# Patient Record
(demographics unavailable — no encounter records)

---

## 2024-12-13 NOTE — ASSESSMENT
[FreeTextEntry1] : Impression: Acute cervical and lumbar sprain, strain left shoulder  This patient will be treated with formal physical therapy along with diclofenac and tizanidine.  She will return in approximately 3-4 weeks for follow-up.

## 2024-12-13 NOTE — PHYSICAL EXAM
[de-identified] : Exam today reveals she has restricted motion in all planes to the cervical spine especially flexion extension spasm and tenderness is noted more so on the left side extending into the left trapezius no obvious trigger points present.  The left shoulder has some very mild swelling and she does have significant restriction of motion in all planes.  She is tender about the proximal humeral segment and subacromial space.  The elbow wrist and hand move well.  With regards to the low back region she has reasonable motion though it is with discomfort at the limits of motion spasm and tenderness is noted on both sides and midline no obvious trigger points present.  She is neurologically stable and intact.  X-rays ordered and taken today of the cervical and lumbar spine and the left shoulder reveal minimal degenerative changes no fractures or malalignment.  The cervical spine reveals mild cervical spondylosis no significant displaced narrowing or facet arthritis.  The lumbar series again mild spondylosis mild disc space narrowing multilevel no lesion

## 2025-01-06 NOTE — HISTORY OF PRESENT ILLNESS
[de-identified] : This 49-year-old returns she is continued to have pain in the neck low back region as well as the left shoulder the low back and left shoulder girdle are more symptomatic.  She has been in physical therapy with only minor improvement noted no motor weakness bladder bowel dysfunction

## 2025-01-06 NOTE — ASSESSMENT
[FreeTextEntry1] : Impression: Cervical and lumbar radiculitis strain left rotator cuff.  She will continue with diclofenac which I have reordered and tizanidine as well as physical therapy for the low back and ongoing to the left shoulder.  If she is not improving MRIs may become necessary

## 2025-01-06 NOTE — PHYSICAL EXAM
[de-identified] : Exam reveals normal stance and gait she has reasonable motion to the cervical spine mild spasm is noted on both sides more so on the left no trigger points left shoulder has slight restriction of full forward flexion and abduction in the scapular plane no crepitus on motion strength is good positive Neer and Zaragoza.  The lumbar segment is noted to be painful on flexion extension with spasm and tenderness on both sides of the midline no trigger points present there are no neurologic tension signs present she is intact neurologically

## 2025-03-31 NOTE — ASSESSMENT
[FreeTextEntry1] : Impression: Continued neck and low back pain.  She will continue with therapy medications have also placed her on gabapentin.  She will return in 1 month possibility of MRI has been discussed

## 2025-03-31 NOTE — HISTORY OF PRESENT ILLNESS
[de-identified] : This 49-year-old returns with continued complaints of episodic pain in the neck and low back region.  She does benefit from physical therapy which has helped.  She does note improvement with the medications as well.  She has no significant radicular symptoms to either upper or lower extremities

## 2025-03-31 NOTE — PHYSICAL EXAM
[de-identified] : Exam is notable for a normal gait.  She has reasonable motion to the cervical lumbar spine though it is with pain at the limits spasm and tenderness nonspecific is noted on both sides and midline at both sites no trigger points present.  She is without tension signs and neurologically intact

## 2025-04-28 NOTE — HISTORY OF PRESENT ILLNESS
[de-identified] : This 49-year-old returns for follow-up of complaints of pain in the neck and low back region.  She has continued to have complaints of ongoing pain and spasm in the neck and low back region.  She has had conservative management to include medications physical therapy with minimal improvement.  She did have MRIs which we have discussed.  Written results of MRIs of the lumbar spine as well as the cervical spine performed at stand-up MRI of [facility] April 8, 2025 are on the chart and have been reviewed.  With regards to the cyst in the kidneys and liver this is being addressed at [facility].

## 2025-04-28 NOTE — ASSESSMENT
[FreeTextEntry1] : Impression: Cervical and lumbar radiculitis with both the level degenerative disc spaces at both sites.  She will continue with medications as prescribed.  She has been referred for pain management

## 2025-06-09 NOTE — PHYSICAL EXAM
[de-identified] : General Appearance: Level of consciousness: Alert Body habitus: Well-nourished Signs of distress: Some noticeable discomfort. Hygiene: Clean   Psychiatric: Appropriate mood and affect. Cooperative.   Skin: Nailbeds pink with no cyanosis or clubbing. Lesions or rashes: None observed   Head and Neck: The head is normocephalic and atraumatic Eyes: Conjunctivae  clear without exudates. Sclera is non-icteric Ears: No discharge. Hearing is intact with good acuity Nose: No discharge. No nasal flaring Mouth and throat: Trachea Midline, teeth and gums are without obvious lesion   Respiratory: Breathing pattern: Normal Chest movement: Symmetrical Use of accessory muscles: Absent Cough: Absent Wheezing: Absent   Cardiovascular System: Pulse: Regular Cyanosis: Absent   Abdomen: Inspection: No distention Visible pulsations: Absent   Musculoskeletal System: Muscle strength:   strength is normal bilaterally. Gait: Steady, NO assistive device Posture: Upright Rises from a seated position with SOME difficulty due to pain     Spine: No gross deformity or signs of trauma. Curvature of the cervical, thoracic, and lumbar spine are within normal limits. Spinous processes are midline. NO tenderness of spinous processes. Paraspinal musculature is NOT hypertonic NO discomfort is noted with flexion MILD discomfort is noted with extension No discomfort is noted with side-to-side rotation   Straight leg raise test is negative bilaterally.   Joint examination: No Swelling, No gross deformities.     Neurological System: Cranial nerves 2-12: Grossly intact Motor function: Moving all extremities against gravity Dorsi/plantar flexion is normal bilaterally. Sensation: No gross deficit to light touch

## 2025-06-09 NOTE — ASSESSMENT
[FreeTextEntry1] : Ms. CHRISTINA ALONZO is a 49 year F suffering from neck back pain, that based upon today's subjective complaints, physical examination, and MRI review, is likely multifactorial  >> Medications  Chronic opioid use for non-malignant pain, in particular at high doses would not be recommended since it can potentially lead to hyperalgesia (hypersensitivity), tolerance and addiction.    Cw Tizanidine, Diclofenac PRN   >> Interventions   Not amenable to RUTHIE or in office TPI - will research more  4.4 cm c7/t1   >> Therapy and Other Modalities   diagnosis: cervical spondylosis  periscapular and scapulothoracic stretching and strengthening teach home exercise regimen massage and myofascial release increase ROM, strengthening, postural training, other modalities [facility] - 2x weekly / 6 weeks Precautions - universal safety, falls  >> Imaging and Other Studies  See Media   >> Consults  None ordered

## 2025-06-09 NOTE — REASON FOR VISIT
[Initial Consultation] : an initial pain management consultation [Spouse] : spouse [FreeTextEntry2] : Referred by Dr. Price - Orthopedic and PCP - Dr. Johnson

## 2025-06-09 NOTE — DATA REVIEWED
[FreeTextEntry1] :  First Name:	CHRISTINA Last Name:	CHERI Date of Birth:	06-Sep-1975 Gender:	F MRN:	[id number] Exam Date:	08-Apr-2025 3:15:00 PM Exam Description:	CERVICAL SPINE MRI [id number] Referring Physician:	DEAN VERAS Physician(s):	 Report Date:	08-Apr-2025 3:15:00 PM Completion Status:	COMPLETE Verification Status:	VERIFIED Diagnostic Imaging Report Language of Content Item and Descendants: English  Procedure Study Instance UID: [id number]  Subject Name: CHRISTINA ALONZO  Findings Finding: PATIENT NAME: CHRISTINA ALONZO  ID NUMBER: [id number]  YOB: 1975  REFERRING PHYSICIAN: DEAN VERAS MD [address] [place] [id number]  DATE OF SERVICE: 04/08/2025  MAGNETIC RESONANCE IMAGING OF THE CERVICAL SPINE  TECHNIQUE: Multiplanar, multisequential MRI was performed in the neutral/sitting position.  HISTORY: Cervical pain and radiculopathy.  INTERPRETATION: Straightening of the cervical lordosis noted. Craniocervical junction is normal.  C2-C3, C3-C4: No disc herniation, central canal or foraminal narrowing.  C4-C5: Central disc herniation abuts the cord. Neural foramina are patent.  C5-C6: Posterior disc herniation abuts the cord. Neural foramina remain patent.  C6-C7: Posterior central disc herniation indents the ventral CSF space without central canal or foraminal narrowing.  C7-T1: No disc herniation, central canal or foraminal narrowing.  No evidence for fracture, disc space narrowing, anterolisthesis, marrow edema, infiltrative marrow process, or focal intraosseous lesion. No levels with central stenosis. Craniocervical junction is normal.  Examination, otherwise, demonstrates no significant protrusions into the neural canal, recesses or foramina. The cervical cord is, otherwise, unremarkable in signal and morphology. There is, otherwise, no evidence of syrinx or Chiari malformation. No focal prevertebral or posterior paraspinal abnormal masses or altered signals are, otherwise, noted.  IMPRESSION:  * Posterior central disc herniations, C4-C5, C5-C6, C6-C7.  * Straightening of the cervical lordosis.  Ramsey Martinez MD  Diplomate of the American Board of Radiology  with Added Qualifications in Neuroradiology  SM/rt2   First Name:	CHRISTINA Last Name:	CHERI Date of Birth:	06-Sep-1975 Gender:	F MRN:	[id number] Exam Date:	08-Apr-2025 3:29:00 PM Exam Description:	LUMBAR SPINE MRI [id number] Referring Physician:	DEAN VERAS Physician(s):	 Report Date:	08-Apr-2025 3:15:00 PM Completion Status:	COMPLETE Verification Status:	VERIFIED Diagnostic Imaging Report Language of Content Item and Descendants: English  Procedure Study Instance UID: [id number]  Subject Name: CHRISTINA ALONZO  Findings Finding: PATIENT NAME: CHRISTINA ALONZO  ID NUMBER: [id number]  YOB: 1975  REFERRING PHYSICIAN: DEAN VERAS MD [address] [place] [id number]  DATE OF SERVICE: 04/08/2025  MAGNETIC RESONANCE IMAGING OF THE LUMBAR SPINE  TECHNIQUE: Multiplanar, multisequential MRI was performed in the neutral/sitting position.  HISTORY: Back pain.  INTERPRETATION: Straightening of the lumbar lordosis noted, may indicate reflex muscle spasm.  T12-L1, L1-L2, L2-L3: No disc herniation, central canal or foraminal narrowing.  L3-L4: Posterior disc bulge indents the thecal sac without central canal or foraminal narrowing.  L4-L5: Central subligamentous disc herniation indents the thecal sac. Canal and neural foramina are otherwise patent.  L5-S1: Disc bulge and a left lateral recess, left foraminal disc herniation impinging the traversing left S1 and exiting left L5 nerve roots. Right neural foramen and canal are otherwise patent.  No fracture, disc space narrowing, anterolisthesis, marrow edema, infiltrative marrow process, focal intraosseous lesion, or central stenosis. The conus is normal and terminates at T12-L1. Cauda equina is unremarkable.  Exam demonstrates numerous bilateral renal cystic masses measuring up to 5 cm with additional numerous hepatic cystic lesions measuring up to 6 cm in the left hepatic lobe, likely polycystic renal disease. Recommend abdominal sonography and further evaluation.  IMPRESSION:  * Numerous bilateral hepatic and renal cystic masses, likely polycystic renal disease. Recommend abdominal sonography and further evaluation.  * L5-S1: Left lateral recess and left foraminal disc herniation impinging traversing left S1 and exiting left L5 nerve roots.  * L4-L5: Posterior central subligamentous disc herniation indents the thecal sac.  Ramsey Martinez MD  Diplomate of the American Board of Radiology  with Added Qualifications in Neuroradiology  SM/rt2

## 2025-06-09 NOTE — HISTORY OF PRESENT ILLNESS
[Back Pain] : back pain [Neck Pain] : neck pain [___ mths] : [unfilled] month(s) ago [Constant] : constant [7] : a current pain level of 7/10 [5] : a minimum pain level of 5/10 [10] : a maximum pain level of 10/10 [Throbbing] : throbbing [Laying] : laying [Sitting] : sitting [FreeTextEntry1] : HPI - Ms. Kraolina Armstrong, a 49-year-old female, presents for an initial pain management consultation, referred by Dr. Price and PCP Dr. Johnson. She reports chronic back and neck pain, originating from a fall in December 2024, with involvement of the spine, neck, and shoulder areas. The pain onset was over six months ago and is described as constant, throbbing, and currently rated 7/10 in severity. She reports an average pain level of 5/10 and a maximum pain level of 10/10, experiencing functional and emotional disability due to the pain's impact on daily activities such as housework and sleep. The pain is exacerbated by laying, sitting, and attempting to get out of bed, while alleviated by chiropractic and physiotherapy, and improved with rest.  Ms. Armstrong has undergone six weeks of provider-directed treatment, including stretching regimens and physiotherapy, with minimal improvement. She has no significant past medical history, allergies to penicillins, and is currently prescribed Diclofenac Sodium, Gabapentin, Meloxicam, and Tizanidine for pain and muscle spasms. MRI results indicate cervical radiculitis primarily due to posterior central disc herniations at C4-C5, C5-C6, and C6-C7, with straightening of the cervical lordosis. Similarly, lumbar radiculitis is noted with involvement at L4-L5 due to central subligamentous disc herniation and at L5-S1, where left lateral recess and foraminal disc herniations impinge on traversing and exiting nerve roots. Despite conservative management, Ms. Armstrong's symptoms remain persistent and progressing, necessitating further evaluation and pain management strategies. [FreeTextEntry7] : Spine, neck, shoulder due to fall in [employer] on Dec. 8th 2024 [FreeTextEntry3] : Trying to get out of bed [FreeTextEntry4] : Chiropractor - Physiotherapy; sleeping